# Patient Record
Sex: FEMALE | Race: WHITE | NOT HISPANIC OR LATINO | URBAN - NONMETROPOLITAN AREA
[De-identification: names, ages, dates, MRNs, and addresses within clinical notes are randomized per-mention and may not be internally consistent; named-entity substitution may affect disease eponyms.]

---

## 2017-12-29 NOTE — PATIENT DISCUSSION
Retinal tear and detachment warning symptoms reviewed and patient instructed to call if increasing floaters, flashes, or decreasing peripheral vision develop.

## 2019-10-21 ENCOUNTER — IMPORTED ENCOUNTER (OUTPATIENT)
Dept: URBAN - NONMETROPOLITAN AREA CLINIC 1 | Facility: CLINIC | Age: 74
End: 2019-10-21

## 2019-10-21 PROBLEM — Z96.1: Noted: 2019-10-21

## 2019-10-21 PROBLEM — H02.132: Noted: 2019-10-21

## 2019-10-21 PROBLEM — H02.135: Noted: 2019-10-21

## 2019-10-21 PROCEDURE — 92002 INTRM OPH EXAM NEW PATIENT: CPT

## 2019-10-21 NOTE — PATIENT DISCUSSION
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 .-This can result in excessive tearing irritation infection scarring and loss of vision.-Treatment options include observation or surgical correction.-Risks and benefits of ectropion repair were discussed and pt elects to proceed. Will schedule at patient's convenience. Pt has  large defect knotch RLL s/p surgery from Dr. Kyrie Mensah preventing proper drainage of tears and causing dryness . We will do a Pentagonal  wedge resection RLL and then we will look at OS. We Will do this procedure @ Located within [facility] and will have Lexus call patient to  schedule this. Patient c/o drynes informed her will check that after procudure if no improvement. Discussd possible plug insertion to help control dryness s/p procedure.  Best number for [facility] to reach patient is [phone] task sent to [facility]. -Stable with no PCO noted on todays exam -Continue to monitor

## 2019-12-04 ENCOUNTER — IMPORTED ENCOUNTER (OUTPATIENT)
Dept: URBAN - NONMETROPOLITAN AREA CLINIC 1 | Facility: CLINIC | Age: 74
End: 2019-12-04

## 2019-12-04 PROCEDURE — 67966 REVISION OF EYELID: CPT

## 2019-12-04 NOTE — PATIENT DISCUSSION
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 .-This can result in excessive tearing irritation infection scarring and loss of vision.-Treatment options include observation or surgical correction.-Risks and benefits of ectropion repair were discussed and pt elects to proceed. Will schedule at patient's convenience. Pt has  large defect knotch RLL s/p surgery from Dr. Donald Julian preventing proper drainage of tears and causing dryness . We will do a Pentagonal  wedge resection RLL and then we will look at OS. We Will do this procedure @ [facility] and will have Lexus call patient to  schedule this. Patient c/o drynes informed her will check that after procudure if no improvement. Discussd possible plug insertion to help control dryness s/p procedure.  Best number for [employer] to reach patient is [phone] task sent to [facility]. -Stable with no PCO noted on todays exam -Continue to monitor

## 2019-12-16 ENCOUNTER — IMPORTED ENCOUNTER (OUTPATIENT)
Dept: URBAN - NONMETROPOLITAN AREA CLINIC 1 | Facility: CLINIC | Age: 74
End: 2019-12-16

## 2019-12-16 PROBLEM — Z96.1: Noted: 2019-12-16

## 2019-12-16 PROBLEM — Z98.890: Noted: 2019-12-16

## 2019-12-16 PROBLEM — H02.132: Noted: 2019-12-16

## 2019-12-16 PROBLEM — H02.135: Noted: 2019-12-16

## 2019-12-16 PROCEDURE — 99024 POSTOP FOLLOW-UP VISIT: CPT

## 2019-12-16 NOTE — PATIENT DISCUSSION
Ectropion s/p Wedge Resection RLL- Suture removed today without difficulty. - Left wedge suture in place for 1 more week and will have Dr. Jodi Ivan remove. Ectropion  LLL - Recommend observation at this time. Sp PCIOL OU. -Stable with no PCO noted on todays exam -Continue to monitor

## 2019-12-23 ENCOUNTER — IMPORTED ENCOUNTER (OUTPATIENT)
Dept: URBAN - NONMETROPOLITAN AREA CLINIC 1 | Facility: CLINIC | Age: 74
End: 2019-12-23

## 2019-12-23 PROBLEM — H02.135: Noted: 2019-12-16

## 2019-12-23 PROBLEM — H53.2: Noted: 2019-12-23

## 2019-12-23 PROBLEM — H02.132: Noted: 2019-12-16

## 2019-12-23 PROBLEM — Z96.1: Noted: 2019-12-23

## 2019-12-23 PROBLEM — Z98.890: Noted: 2019-12-16

## 2019-12-23 PROCEDURE — 99024 POSTOP FOLLOW-UP VISIT: CPT

## 2019-12-23 NOTE — PATIENT DISCUSSION
Ectropion s/p Wedge Resection RLL- Suture removed today without difficulty. - Left wedge suture in place for 1 more week and will have Dr. Chel Villalba remove. Ectropion  LLL - Recommend observation at this time. s/p PCIOL OU (Dr. Marilou Vallejo with no PCO noted on todays exam -Continue to monitor Horizontal Diplopia- discussed with patient that she probably needs to have refraction to rule out anisometropia and if symptoms persist recommend further diplopia evaluation.

## 2020-01-06 ENCOUNTER — IMPORTED ENCOUNTER (OUTPATIENT)
Dept: URBAN - NONMETROPOLITAN AREA CLINIC 1 | Facility: CLINIC | Age: 75
End: 2020-01-06

## 2020-01-06 PROCEDURE — 99024 POSTOP FOLLOW-UP VISIT: CPT

## 2020-01-06 NOTE — PATIENT DISCUSSION
Ectropion s/p Wedge Resection RLL- patient looks good on today's exam - small central knotch no complications indicated- continue to monitor s/p PCIOL OU (Dr. Epifanio Plasencia with no PCO noted on todays exam -Continue to monitor Horizontal Diplopia- no diplopia per patient x 1 week - MR done today if patient wishes to get glasses SOLOMON - discussed all symptoms in associated in detail with patient - no problems with puncta or lid at this time - recommend using AT's throughout the day - patient states she has rosacea do not recommend topical creams around eyelids discussed oral options in detail with patient - continue to monitor

## 2022-04-15 ASSESSMENT — VISUAL ACUITY
OD_CC: 20/40+
OS_CC: 20/20
OD_PH: 20/20
OD_CC: 20/50-
OS_CC: 20/20
OD_CC: 20/50
OD_PH: 20/20
OD_CC: 20/50-2

## 2022-04-15 ASSESSMENT — TONOMETRY
OS_IOP_MMHG: 15
OD_IOP_MMHG: 14